# Patient Record
Sex: MALE | URBAN - METROPOLITAN AREA
[De-identification: names, ages, dates, MRNs, and addresses within clinical notes are randomized per-mention and may not be internally consistent; named-entity substitution may affect disease eponyms.]

---

## 2019-11-05 ENCOUNTER — APPOINTMENT (RX ONLY)
Dept: URBAN - METROPOLITAN AREA CLINIC 73 | Facility: CLINIC | Age: 69
Setting detail: DERMATOLOGY
End: 2019-11-05

## 2019-11-05 DIAGNOSIS — L738 OTHER SPECIFIED DISEASES OF HAIR AND HAIR FOLLICLES: ICD-10-CM

## 2019-11-05 DIAGNOSIS — L0292 CARBUNCLE AND FURUNCLE OF UNSPECIFIED SITE: ICD-10-CM

## 2019-11-05 DIAGNOSIS — L73.9 FOLLICULAR DISORDER, UNSPECIFIED: ICD-10-CM

## 2019-11-05 DIAGNOSIS — L0293 CARBUNCLE AND FURUNCLE OF UNSPECIFIED SITE: ICD-10-CM

## 2019-11-05 DIAGNOSIS — L663 OTHER SPECIFIED DISEASES OF HAIR AND HAIR FOLLICLES: ICD-10-CM

## 2019-11-05 PROBLEM — L02.221 FURUNCLE OF ABDOMINAL WALL: Status: ACTIVE | Noted: 2019-11-05

## 2019-11-05 PROBLEM — I10 ESSENTIAL (PRIMARY) HYPERTENSION: Status: ACTIVE | Noted: 2019-11-05

## 2019-11-05 PROBLEM — L02.231 CARBUNCLE OF ABDOMINAL WALL: Status: ACTIVE | Noted: 2019-11-05

## 2019-11-05 PROCEDURE — ? COUNSELING

## 2019-11-05 PROCEDURE — ? INTRALESIONAL KENALOG

## 2019-11-05 PROCEDURE — ? INCISION AND DRAINAGE

## 2019-11-05 PROCEDURE — 11900 INJECT SKIN LESIONS </W 7: CPT

## 2019-11-05 PROCEDURE — ? PATIENT SPECIFIC COUNSELING

## 2019-11-05 PROCEDURE — 10060 I&D ABSCESS SIMPLE/SINGLE: CPT

## 2019-11-05 ASSESSMENT — LOCATION SIMPLE DESCRIPTION DERM: LOCATION SIMPLE: ABDOMEN

## 2019-11-05 ASSESSMENT — LOCATION DETAILED DESCRIPTION DERM
LOCATION DETAILED: PERIUMBILICAL SKIN
LOCATION DETAILED: EPIGASTRIC SKIN

## 2019-11-05 ASSESSMENT — LOCATION ZONE DERM: LOCATION ZONE: TRUNK

## 2019-11-05 ASSESSMENT — PAIN INTENSITY VAS: HOW INTENSE IS YOUR PAIN 0 BEING NO PAIN, 10 BEING THE MOST SEVERE PAIN POSSIBLE?: NO PAIN

## 2019-11-05 ASSESSMENT — SEVERITY ASSESSMENT: SEVERITY: MILD

## 2019-11-05 NOTE — PROCEDURE: PATIENT SPECIFIC COUNSELING
- Recommend CLN body wash in shower
Detail Level: Detailed
- Disc drainage vs kenalog injection vs biopsy, pt elected kenalog injection today\\n- Will hold off on bx until inflammation subsides. Once subsided, can proceed with bx\\nRTC 2 weeks for 20 min sx

## 2019-11-05 NOTE — HPI: BOILS
Is This A New Presentation, Or A Follow-Up?: Boil
How Severe Are Your Boils?: moderate
Additional History: Patient presents for nw boil located on abdomen x months. Pr reports he does get boils frequently on his abdomen and chest. Pt has not had prior treatment. Pt notes this boil is irritated but denies pain. Pt states the boil feels better with drainage. Pt denies using hot compresses. Pt reports the past boils he has had popped which usually causes relief. Pt wife reports she tried to drain it and pus/ blood came out.

## 2019-11-05 NOTE — PROCEDURE: INTRALESIONAL KENALOG
Administered By (Optional): ZAIDA
X Size Of Lesion In Cm (Optional): 0
Medical Necessity Clause: This procedure was medically necessary because the lesions that were treated were:
Detail Level: Detailed
Kenalog Preparation: Kenalog
Include Z78.9 (Other Specified Conditions Influencing Health Status) As An Associated Diagnosis?: No
Total Volume Injected (Ccs- Only Use Numbers And Decimals): .5
Concentration Of Solution Injected (Mg/Ml): 5.0
Consent: The risks of atrophy were reviewed with the patient.

## 2019-11-05 NOTE — PROCEDURE: INCISION AND DRAINAGE
Curette Text (Optional): After the contents were expressed a curette was used to partially remove the cyst wall.
Render Postcare In Note?: Yes
Post-Care Instructions: I reviewed with the patient in detail post-care instructions. Patient should keep wound covered and call the office should any redness, pain, swelling or worsening occur.
Lesion Type: Carbuncle
Anesthesia Type: 1% lidocaine with epinephrine
Anesthesia Volume In Cc: 0.3
Detail Level: Detailed
Suture Text: The incision was partially closed with
Epidermal Sutures: 4-0 Ethilon
Size Of Lesion In Cm (Optional But May Be Required For Some Insurances): 0
Epidermal Closure: simple interrupted
Method: 11 blade
Wound Care: Petrolatum
Consent was obtained and risks were reviewed including but not limited to delayed wound healing, infection, need for multiple I and D's, and pain.
Curette: No
Preparation Text: The area was prepped in the usual clean fashion.
Dressing: dry sterile dressing